# Patient Record
Sex: FEMALE | Race: ASIAN
[De-identification: names, ages, dates, MRNs, and addresses within clinical notes are randomized per-mention and may not be internally consistent; named-entity substitution may affect disease eponyms.]

---

## 2018-02-18 ENCOUNTER — HOSPITAL ENCOUNTER (EMERGENCY)
Dept: HOSPITAL 89 - ER | Age: 18
LOS: 1 days | Discharge: HOME | End: 2018-02-19
Payer: COMMERCIAL

## 2018-02-18 DIAGNOSIS — N30.01: Primary | ICD-10-CM

## 2018-02-18 PROCEDURE — 99284 EMERGENCY DEPT VISIT MOD MDM: CPT

## 2018-02-18 PROCEDURE — 81025 URINE PREGNANCY TEST: CPT

## 2018-02-18 PROCEDURE — 81001 URINALYSIS AUTO W/SCOPE: CPT

## 2018-02-18 PROCEDURE — 87088 URINE BACTERIA CULTURE: CPT

## 2018-02-19 VITALS — SYSTOLIC BLOOD PRESSURE: 128 MMHG | DIASTOLIC BLOOD PRESSURE: 69 MMHG

## 2018-02-19 NOTE — ER REPORT
History and Physical


Time Seen By MD:  23:56


Hx. of Stated Complaint:  


Pt reporting bladder spasms and trouble urinating at 1800 tonight.  Pt states 


pain comes and goes.


HPI/ROS


CHIEF COMPLAINT: Fever, urinary discomfort





HISTORY OF PRESENT ILLNESS: 17-year-old female presents ambulatory to the ER 

complaining of urinary discomfort 6 6 PM.  Patient notes fevers this evening.  

She notes feeling weak.  She denies nausea, vomiting, headache or stiff neck.  

She denies back pain.  She states her last menstrual period was 3 weeks ago.





REVIEW OF SYSTEMS:


Respiratory: No cough, no dyspnea.


Cardiovascular: No chest pain, no palpitations.


Gastrointestinal: No vomiting, no abdominal pain.


Musculoskeletal: No back pain.


Allergies:  


Coded Allergies:  


     crab (Verified  Allergy, Unknown, 2/18/18)


     shrimp (Verified  Allergy, Unknown, 2/18/18)


Home Meds


Active Scripts


Cephalexin Monohydrate (CEPHALEXIN) 500 Mg Cap, 500 MG PO TID for infection, #

20 CAP


   TAKE 1 CAPSULE BY MOUTH EVERY SIX HOURS


   Prov:EDY LAI DO         2/19/18


Reviewed Nurses Notes:  Yes


Old Medical Records Reviewed:  Yes


Constitutional





Vital Sign - Last 24 Hours








 2/18/18 2/18/18 2/18/18 2/19/18





 23:55 23:56 23:57 00:01


 


Temp  99.1  


 


Pulse   128 


 


Resp  20  


 


B/P (MAP) 141/78 (99) 141/78  141/89 (106)


 


Pulse Ox  98 99 


 


O2 Delivery  Room Air  


 


    





 2/19/18 2/19/18 2/19/18 2/19/18





 00:06 00:11 00:16 00:16


 


Temp   100.2 


 


Pulse 130 129  130


 


Resp 20   21


 


Pulse Ox 98 98  97


 


    





 2/19/18 2/19/18 2/19/18 





 00:21 00:30 00:31 


 


Pulse 134  130 


 


Resp 19  24 


 


B/P (MAP)  128/69 (88)  


 


Pulse Ox 95   








Physical Exam


General Appearance: The patient is alert, has no immediate need for airway 

protection and no current signs of toxicity.  Temp 100.2


Eyes: Pupils equal and round no injection.


Respiratory: Chest is non tender, lungs are clear to auscultation.


Cardiac: regular rate and rhythm


Gastrointestinal: Abdomen is soft and non tender, no masses, bowel sounds 

normal.  No CVA tenderness


Musculoskeletal:  Neck: Neck is supple and non tender.  No lymphadenopathy


Extremities have full range of motion and are non tender.


Skin: No rashes or lesions.





DIFFERENTIAL DIAGNOSIS: After history and physical exam differential diagnosis 

was considered for abdominal pain in a female including but not limited to 

ovarian cyst, pelvic inflammatory disease, ovarian torsion, urinary tract 

infection, and appendicitis.





Medical Decision Making


Data Points


Laboratory





Hematology








Test


  2/19/18


00:00


 


Urine Color Red 


 


Urine Clarity Clear 


 


Urine pH


  6.0 pH


(4.8-9.5)


 


Urine Specific Gravity 1.002 


 


Urine Protein


  100 mg/dL


(NEGATIVE)


 


Urine Glucose (UA)


  Negative mg/dL


(NEGATIVE)


 


Urine Ketones


  Negative mg/dL


(NEGATIVE)


 


Urine Blood


  Large


(NEGATIVE)


 


Urine Nitrite


  Negative


(NEGATIVE)


 


Urine Bilirubin


  Negative


(NEGATIVE)


 


Urine Urobilinogen


  Negative mg/dL


(0.2-1.9)


 


Urine Leukocyte Esterase


  Small


(NEGATIVE)


 


Urine RBC


  2 /HPF


(0-2/HPF)


 


Urine WBC


  33 /HPF


(0-5/HPF)


 


Urine Squamous Epithelial


Cells Few /LPF


(</=FEW)


 


Urine Bacteria


  Few /HPF


(NONE-FEW)


 


Urine Mucus


  None /HPF


(NONE-FEW)


 


Urine HCG, Qualitative


  Negative


(NEGATIVE)








Chemistry








Test


  2/19/18


00:00


 


Urine Color Red 


 


Urine Clarity Clear 


 


Urine pH


  6.0 pH


(4.8-9.5)


 


Urine Specific Gravity 1.002 


 


Urine Protein


  100 mg/dL


(NEGATIVE)


 


Urine Glucose (UA)


  Negative mg/dL


(NEGATIVE)


 


Urine Ketones


  Negative mg/dL


(NEGATIVE)


 


Urine Blood


  Large


(NEGATIVE)


 


Urine Nitrite


  Negative


(NEGATIVE)


 


Urine Bilirubin


  Negative


(NEGATIVE)


 


Urine Urobilinogen


  Negative mg/dL


(0.2-1.9)


 


Urine Leukocyte Esterase


  Small


(NEGATIVE)


 


Urine RBC


  2 /HPF


(0-2/HPF)


 


Urine WBC


  33 /HPF


(0-5/HPF)


 


Urine Squamous Epithelial


Cells Few /LPF


(</=FEW)


 


Urine Bacteria


  Few /HPF


(NONE-FEW)


 


Urine Mucus


  None /HPF


(NONE-FEW)


 


Urine HCG, Qualitative


  Negative


(NEGATIVE)








Urinalysis








Test


  2/19/18


00:00


 


Urine Color Red 


 


Urine Clarity Clear 


 


Urine pH


  6.0 pH


(4.8-9.5)


 


Urine Specific Gravity 1.002 


 


Urine Protein


  100 mg/dL


(NEGATIVE)


 


Urine Glucose (UA)


  Negative mg/dL


(NEGATIVE)


 


Urine Ketones


  Negative mg/dL


(NEGATIVE)


 


Urine Blood


  Large


(NEGATIVE)


 


Urine Nitrite


  Negative


(NEGATIVE)


 


Urine Bilirubin


  Negative


(NEGATIVE)


 


Urine Urobilinogen


  Negative mg/dL


(0.2-1.9)


 


Urine Leukocyte Esterase


  Small


(NEGATIVE)


 


Urine RBC


  2 /HPF


(0-2/HPF)


 


Urine WBC


  33 /HPF


(0-5/HPF)


 


Urine Squamous Epithelial


Cells Few /LPF


(</=FEW)


 


Urine Bacteria


  Few /HPF


(NONE-FEW)


 


Urine Mucus


  None /HPF


(NONE-FEW)


 


Urine HCG, Qualitative


  Negative


(NEGATIVE)











ED Course/Re-evaluation


ED Course


Patient admitted to an examination room.  H&P was done.  The differential 

diagnoses was considered.  On clinical examination.  Patient has a benign 

nonsurgical abdomen.  Her urinalysis shows  infection.  A urinary culture was 

ordered.  Patient be treated with Keflex 500 mg 3 times a day.  1st doses and 

 here in the ER.  Patient was also given Pyridium.  She is advised to 

take ibuprofen to control the fevers.


Decision to Disposition Date:  Feb 19, 2018


Decision to Disposition Time:  00:19





Depart


Departure


Latest Vital Signs





Vital Signs








  Date Time  Temp Pulse Resp B/P (MAP) Pulse Ox O2 Delivery O2 Flow Rate FiO2


 


2/19/18 00:31  130 24     


 


2/19/18 00:30    128/69 (88)    


 


2/19/18 00:21     95   


 


2/19/18 00:16 100.2       


 


2/18/18 23:56      Room Air  








Impression:  


 Primary Impression:  


 Urinary tract infection


Condition:  Improved


Disposition:  HOME OR SELF-CARE


Referrals:  


JAKE CABRERA MD





 [facility]


New Scripts


Cephalexin Monohydrate (CEPHALEXIN) 500 Mg Cap


500 MG PO TID for infection, #20 CAP


   TAKE 1 CAPSULE BY MOUTH EVERY SIX HOURS


   Prov: EDY LAI DO         2/19/18


Departure Forms:  Medications Reconciliation,    Patient Portal Information, ER 

Transition Record


Patient Instructions:  Urinary Tract Infection in Women (ED)





Additional Instructions:  


Drink plenty of fluids


You can continue to take Uristat or Azo-Standard to urinary discomfort


Take ibuprofen 200 mg 2-3 tablets 3 times a day to control fevers


Follow-up with your primary care or student health if unimproved in 3-5 days





Problem Qualifiers








 Primary Impression:  


 Urinary tract infection


 Urinary tract infection type:  acute cystitis  Hematuria presence:  with 

hematuria  Qualified Codes:  N30.01 - Acute cystitis with hematuria








EDY LAI DO Feb 19, 2018 00:22